# Patient Record
Sex: FEMALE | Race: WHITE
[De-identification: names, ages, dates, MRNs, and addresses within clinical notes are randomized per-mention and may not be internally consistent; named-entity substitution may affect disease eponyms.]

---

## 2017-01-04 ENCOUNTER — HOSPITAL ENCOUNTER (EMERGENCY)
Dept: HOSPITAL 33 - ED | Age: 32
LOS: 1 days | Discharge: HOME | End: 2017-01-05
Payer: MEDICARE

## 2017-01-04 DIAGNOSIS — G40.409: Primary | ICD-10-CM

## 2017-01-04 DIAGNOSIS — E05.90: ICD-10-CM

## 2017-01-04 DIAGNOSIS — Z87.898: ICD-10-CM

## 2017-01-04 DIAGNOSIS — H54.8: ICD-10-CM

## 2017-01-04 LAB
ALBUMIN SERPL-MCNC: 3.6 G/DL (ref 3.4–5)
ALP SERPL-CCNC: 37 U/L (ref 46–116)
ALT SERPL-CCNC: 14 U/L (ref 12–78)
ANION GAP SERPL CALC-SCNC: 14.2 MEQ/L (ref 5–15)
AST SERPL QL: 15 U/L (ref 15–37)
BASOPHILS NFR BLD AUTO: 0.2 % (ref 0–0.4)
BILIRUB BLD-MCNC: 0.6 MG/DL (ref 0.2–1)
BUN SERPL-MCNC: 10 MG/DL (ref 9–20)
CHLORIDE SERPL-SCNC: 108 MEQ/L (ref 98–107)
CO2 SERPL-SCNC: 25.3 MEQ/L (ref 21–32)
COLLECTION TYPE: (no result)
COMPLETE URINE MICROSCOPIC?: NO
GLUCOSE SERPL-MCNC: 104 MG/DL (ref 70–110)
LIPASE SERPL-CCNC: 301 U/L (ref 73–393)
MCH RBC QN AUTO: 31.3 PG (ref 26–32)
NEUTROPHILS NFR BLD AUTO: 56.4 % (ref 36–66)
PLATELET # BLD AUTO: 204 K/MM3 (ref 150–450)
POTASSIUM SERPLBLD-SCNC: 3.6 MEQ/L (ref 3.5–5.1)
PROT SERPL-MCNC: 7.1 GM/DL (ref 6.4–8.2)
RBC # BLD AUTO: 3.79 M/MM3 (ref 4.1–5.4)
SODIUM SERPL-SCNC: 144 MEQ/L (ref 136–145)
WBC # BLD AUTO: 4.9 K/MM3 (ref 4–10.5)

## 2017-01-04 PROCEDURE — 36415 COLL VENOUS BLD VENIPUNCTURE: CPT

## 2017-01-04 PROCEDURE — 84703 CHORIONIC GONADOTROPIN ASSAY: CPT

## 2017-01-04 PROCEDURE — 96361 HYDRATE IV INFUSION ADD-ON: CPT

## 2017-01-04 PROCEDURE — 83605 ASSAY OF LACTIC ACID: CPT

## 2017-01-04 PROCEDURE — 83690 ASSAY OF LIPASE: CPT

## 2017-01-04 PROCEDURE — 99283 EMERGENCY DEPT VISIT LOW MDM: CPT

## 2017-01-04 PROCEDURE — 81002 URINALYSIS NONAUTO W/O SCOPE: CPT

## 2017-01-04 PROCEDURE — 96374 THER/PROPH/DIAG INJ IV PUSH: CPT

## 2017-01-04 PROCEDURE — 96360 HYDRATION IV INFUSION INIT: CPT

## 2017-01-04 PROCEDURE — 93005 ELECTROCARDIOGRAM TRACING: CPT

## 2017-01-04 PROCEDURE — 85025 COMPLETE CBC W/AUTO DIFF WBC: CPT

## 2017-01-04 PROCEDURE — 82550 ASSAY OF CK (CPK): CPT

## 2017-01-04 PROCEDURE — 80307 DRUG TEST PRSMV CHEM ANLYZR: CPT

## 2017-01-04 PROCEDURE — 80053 COMPREHEN METABOLIC PANEL: CPT

## 2017-01-04 NOTE — ERPHSYRPT
- History of Present Illness


Time Seen by Provider: 01/04/17 22:39


Source: family ( and sister), EMS


Patient Subjective Stated Complaint: per pt's sister, pt has seizure like 

epidoses when she has pain. has had 15+ episodes today since 2030


Triage Nursing Assessment: pt in bed with eyes closed. does not respond to 

verbal. pt not able to follow commands. family in [facility] states this is normal 

for pt when she has these episodes and she will remain nonresponsive until she 

has pain relief.


Physician History: 





CC: seizure episode


Hx: 30 y/o patient Dr Johnson and [facility] GI Dr Rodriguez. She has hx of 

nonepileptiform seizures diagnosed with video EEG at [facility]. She has hx of 

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 She does 

not take narcotics so as not to worsened sphincter dysfunction. She usually 

received ativan and toradol and improves.





She went to [facility] and began having intermittent seizure episodes. No 

provoking incident or stress. She has several children at home. She is 

accompanied here by sister and . No fall or injury. No recent fever, 

headache, vomiting. The episodes today are consistent with those of the past. 





Surg: BTL, GB, vaginal reconstruction


Social: Blind form premature macular degeneration


ILL: hypothyroidism





Allergies/Adverse Reactions: 








No Known Drug Allergies Allergy (Unverified 01/04/17 22:37)


 





Home Medications: 








Levothyroxine Sodium 50 Mcg*** [Synthroid 50 Mcg***] 50 mcg PO DAILY 01/04/17 [

History]





Hx Tetanus, Diphtheria Vaccination/Date Given: Yes


Hx Influenza Vaccination/Date Given: No


Hx Pneumococcal Vaccination/Date Given: No


Immunizations Up to Date: Yes





- Review of Systems


Constitutional: No Fever, No Chills


Eyes: No Symptoms


Ears, Nose, & Throat: No Symptoms


Respiratory: No Cough, No Dyspnea


Cardiac: No Chest Pain, No Syncope


Abdominal/Gastrointestinal: Abdominal Pain, No Nausea, No Vomiting, No Diarrhea


Genitourinary Symptoms: No Dysuria


Skin: No Rash


Neurological: Seizure, No Focal Weakness, No Headache, No Parasthesia


All Other Systems: Reviewed and Negative (from family)





- Past Medical History


Pertinent Past Medical History: Yes


Neurological History: Other


Endocrine Medical History: Hyperthyroidism


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 

like activity whe she has pain. legally blind





- Past Surgical History


Past Surgical History: Yes


Female Surgical History: Tubal Ligation, Other


Other Surgical History: vaginal reconstruction





- Social History


Smoking Status: Never smoker


Exposure to second hand smoke: No


Drug Use: none


Patient Lives Alone: No





- Female History


Hx Last Menstrual Period: unsure





- Nursing Vital Signs


Nursing Vital Signs: 


 Initial Vital Signs











Temperature                    97.9 F


 


Temperature Source             Axillary


 


Pulse Rate                     88


 


Respiratory Rate               16


 


Blood Pressure                 118/72

















- Physical Exam


General Appearance: alert


Eye Exam: PERRL/EOMI, other (fluttering of eyes)


Ears, Nose, Throat Exam: normal ENT inspection, moist mucous membranes


Neck Exam: normal inspection, non-tender, supple


Respiratory Exam: normal breath sounds, lungs clear


Cardiovascular Exam: regular rate/rhythm, No murmur


Gastrointestinal/Abdomen Exam: soft, No tenderness, No distention, No mass, No 

guarding


Back Exam: normal inspection, normal range of motion


Extremity Exam: normal inspection, normal range of motion


Neurologic Exam: alert, other (she is poorly responsive but does open eyes, 

squeeze hands and move feet to command.)


Skin Exam: warm, dry, No rash


**SpO2 Interpretation**: normal


SpO2: 98


Oxygen Delivery: Room Air





- Course


Nursing assessment & vital signs reviewed: Yes


EKG Interpreted by Me: RATE (75), Sinus Rhythm, NORMAL AXIS, NORMAL INTERVALS (

QTc 453), NORMAL QRS, NORMAL ST-T


Ordered Tests: 


 Active Orders 24 hr











 Category Date Time Status


 


 Cath for Specimen-Straight STAT Care  01/04/17 22:45 Active


 


 EKG-ER Only STAT Care  01/04/17 22:45 Active


 


 IV Insertion STAT Care  01/04/17 22:45 Active


 


 CBC W DIFF Stat Lab  01/04/17 23:24 Completed


 


 CK-Creatinine Phosphokinase Stat Lab  01/04/17 23:24 Completed


 


 CMP Stat Lab  01/04/17 23:24 Completed


 


 HCG QUALITATIVE,SERUM Stat Lab  01/04/17 23:24 Completed


 


 LIPASE Stat Lab  01/04/17 23:24 Completed


 


 Lactic Acid Urgent Lab  01/04/17 23:10 Completed


 


 UA Stat Lab  01/04/17 23:25 Completed


 


 Urine Triage Profile Stat Lab  01/04/17 23:25 Completed








Medication Summary














Discontinued Medications














Generic Name Dose Route Start Last Admin





  Trade Name Freq  PRN Reason Stop Dose Admin


 


Sodium Chloride  1,000 mls @ 999 mls/hr  01/04/17 22:45  01/04/17 22:55





  Sodium Chloride 0.9% 1000 Ml  IV  01/04/17 23:45  999 mls/hr





  .Q1H1M STA   Administration


 


Sodium Chloride  Confirm  01/04/17 22:54  





  Sodium Chloride 0.9% 1000 Ml  Administered  01/04/17 22:55  





  Dose   





  1,000 mls @ ud   





  .ROUTE   





  .STK-MED ONE   


 


Ketorolac Tromethamine  30 mg  01/04/17 22:45  01/04/17 22:55





  Toradol 30 Mg Injection***  IV  01/04/17 22:46  30 mg





  STAT ONE   Administration


 


Ketorolac Tromethamine  Confirm  01/04/17 22:54  





  Toradol 30 Mg Injection***  Administered  01/04/17 22:55  





  Dose   





  30 mg   





  .ROUTE   





  .STK-MED ONE   


 


Lorazepam  1 mg  01/04/17 22:45  01/04/17 22:55





  Ativan 2 Mg/1 Ml Vial***  IV  01/04/17 22:46  1 mg





  STAT ONE   Administration


 


Lorazepam  Confirm  01/04/17 22:54  





  Ativan 2 Mg/1 Ml Vial***  Administered  01/04/17 22:55  





  Dose   





  2 mg   





  .ROUTE   





  .STK-MED ONE   











Lab/Rad Data: 


 Laboratory Result Diagrams





 01/04/17 23:24 





 01/04/17 23:24 





 Laboratory Results











  01/04/17 01/04/17 01/04/17 Range/Units





  23:25 23:25 23:24 


 


WBC     (4.0-10.5)  K/mm3


 


RBC     (4.1-5.4)  M/mm3


 


Hgb     (12.0-16.0)  gm/dl


 


Hct     (35-47)  %


 


MCV     ()  fl


 


MCH     (26-32)  pg


 


MCHC     (32-36)  g/dl


 


RDW     (11.5-14.0)  %


 


Plt Count     (150-450)  K/mm3


 


MPV     (6-9.5)  fl


 


Gran %     (36.0-66.0)  %


 


Lymphocytes %     (24.0-44.0)  %


 


Monocytes %     (0.0-12.0)  %


 


Eosinophils %     (0.00-5.0)  %


 


Basophils %     (0.0-0.4)  %


 


Basophils #     (0-0.4)  


 


Sodium     (136-145)  mEq/L


 


Potassium     (3.5-5.1)  mEq/L


 


Chloride     ()  mEq/L


 


Carbon Dioxide     (21-32)  mEq/L


 


Anion Gap     (5-15)  MEQ/L


 


BUN     (9-20)  mg/dL


 


Creatinine     (0.55-1.30)  mg/dl


 


Estimated GFR     ML/MIN


 


Glucose     ()  MG/DL


 


Lactic Acid     (0.4-2.0)  


 


Calcium     (8.5-10.1)  mg/dL


 


Total Bilirubin     (0.2-1.0)  mg/dL


 


AST     (15-37)  U/L


 


ALT     (12-78)  U/L


 


Alkaline Phosphatase     ()  U/L


 


Creatine Kinase     ()  U/L


 


Serum Total Protein     (6.4-8.2)  gm/dL


 


Albumin     (3.4-5.0)  g/dL


 


Lipase     ()  U/L


 


Serum Pregnancy, Qual    NEGATIVE  (Negative)  


 


Ur Collection Type   CATH   


 


Urine Color   YELLOW   (YELLOW)  


 


Urine Appearance   CLEAR   (CLEAR)  


 


Urine pH   5.5   (5-6)  


 


Ur Specific Gravity   <=1.005   (1.005-1.025)  


 


Urine Protein   NEGATIVE   (Negative)  


 


Urine Glucose (UA)   NEGATIVE   (NEGATIVE)  mg/dL


 


Urine Ketones   NEGATIVE   (NEGATIVE)  


 


Urine Nitrite   NEGATIVE   (NEGATIVE)  


 


Urine Bilirubin   NEGATIVE   (NEGATIVE)  


 


Urine Urobilinogen   0.2   (0-1)  mg/dL


 


Urine WBC (Auto)   NEGATIVE   (NEGATIVE)  


 


Urine RBC (Auto)   NEGATIVE   (0-5)  Luis Manuel/ul


 


Urine Opiates Level  NEG.    (NEGATIVE)  


 


Ur Methadone  NEG.    (NEGATIVE)  


 


Urine Barbiturates  NEG.    (NEGATIVE)  


 


Ur Phencyclidine (PCP)  NEG.    (NEGATIVE)  


 


Urine Amphetamine  NEG.    (NEGATIVE)  


 


U Benzodiazepine Level  NEG.    (NEGATIVE)  


 


Urine Cocaine  NEG.    (NEGATIVE)  


 


Urine Marijuana (THC)  NEG.    (NEGATIVE)  


 


Specimen Received   1/4/17 [id number]   














  01/04/17 01/04/17 01/04/17 Range/Units





  23:24 23:24 23:10 


 


WBC   4.9   (4.0-10.5)  K/mm3


 


RBC   3.79 L   (4.1-5.4)  M/mm3


 


Hgb   11.9 L   (12.0-16.0)  gm/dl


 


Hct   34.7 L   (35-47)  %


 


MCV   91.6   ()  fl


 


MCH   31.3   (26-32)  pg


 


MCHC   34.3   (32-36)  g/dl


 


RDW   12.4   (11.5-14.0)  %


 


Plt Count   204   (150-450)  K/mm3


 


MPV   11.0 H   (6-9.5)  fl


 


Gran %   56.4   (36.0-66.0)  %


 


Lymphocytes %   33.3   (24.0-44.0)  %


 


Monocytes %   7.4   (0.0-12.0)  %


 


Eosinophils %   2.7   (0.00-5.0)  %


 


Basophils %   0.2   (0.0-0.4)  %


 


Basophils #   0.01   (0-0.4)  


 


Sodium  144    (136-145)  mEq/L


 


Potassium  3.6    (3.5-5.1)  mEq/L


 


Chloride  108 H    ()  mEq/L


 


Carbon Dioxide  25.3    (21-32)  mEq/L


 


Anion Gap  14.2    (5-15)  MEQ/L


 


BUN  10    (9-20)  mg/dL


 


Creatinine  0.78    (0.55-1.30)  mg/dl


 


Estimated GFR  > 60    ML/MIN


 


Glucose  104    ()  MG/DL


 


Lactic Acid    2.3 H  (0.4-2.0)  


 


Calcium  8.4 L    (8.5-10.1)  mg/dL


 


Total Bilirubin  0.6    (0.2-1.0)  mg/dL


 


AST  15    (15-37)  U/L


 


ALT  14    (12-78)  U/L


 


Alkaline Phosphatase  37 L    ()  U/L


 


Creatine Kinase  67    ()  U/L


 


Serum Total Protein  7.1    (6.4-8.2)  gm/dL


 


Albumin  3.6    (3.4-5.0)  g/dL


 


Lipase  301    ()  U/L


 


Serum Pregnancy, Qual     (Negative)  


 


Ur Collection Type     


 


Urine Color     (YELLOW)  


 


Urine Appearance     (CLEAR)  


 


Urine pH     (5-6)  


 


Ur Specific Gravity     (1.005-1.025)  


 


Urine Protein     (Negative)  


 


Urine Glucose (UA)     (NEGATIVE)  mg/dL


 


Urine Ketones     (NEGATIVE)  


 


Urine Nitrite     (NEGATIVE)  


 


Urine Bilirubin     (NEGATIVE)  


 


Urine Urobilinogen     (0-1)  mg/dL


 


Urine WBC (Auto)     (NEGATIVE)  


 


Urine RBC (Auto)     (0-5)  Luis Manuel/ul


 


Urine Opiates Level     (NEGATIVE)  


 


Ur Methadone     (NEGATIVE)  


 


Urine Barbiturates     (NEGATIVE)  


 


Ur Phencyclidine (PCP)     (NEGATIVE)  


 


Urine Amphetamine     (NEGATIVE)  


 


U Benzodiazepine Level     (NEGATIVE)  


 


Urine Cocaine     (NEGATIVE)  


 


Urine Marijuana (THC)     (NEGATIVE)  


 


Specimen Received     














- Progress


Progress Note: 





01/04/17 23:39


Pt improving with IVF, toradol and ativan. REcords from [facility] reviewed.





01/05/17 00:04


Pt states she is better. She is talking and visiting with family. This appears 

to be nonepileptiform seizures. Normal labs. She reports hx of sphincter of 

oddi dysfunction. Will release with instructions. Family and patient 

comfortable with plan.





Counseled pt/family regarding: lab results, diagnosis, need for follow-up, rad 

results





- Departure


Time of Disposition: 00:05


Departure Disposition: Home


Clinical Impression: 


 nonepileptiform seizures, hx sphincter of oddi dysfunction





Condition: Stable


Critical Care Time: No


Referrals: 


GIL CAN MD [Primary Care Provider] - 


MARIS JOHNSON MD [Family Provider] - 


Instructions:  Seizure (Not Epilepsy/Seizure Disorder)


Additional Instructions: 


No driving, climbing, swimming.


Have someone help with .


Stay with family.


Return for problems or concerns.


Follow up with your physician tomorrow.

## 2017-01-05 VITALS — HEART RATE: 63 BPM | DIASTOLIC BLOOD PRESSURE: 61 MMHG | SYSTOLIC BLOOD PRESSURE: 103 MMHG

## 2017-01-05 VITALS — OXYGEN SATURATION: 99 %

## 2018-06-23 ENCOUNTER — HOSPITAL ENCOUNTER (OUTPATIENT)
Dept: HOSPITAL 33 - ED | Age: 33
Setting detail: OBSERVATION
LOS: 2 days | Discharge: HOME | End: 2018-06-25
Attending: INTERNAL MEDICINE | Admitting: INTERNAL MEDICINE
Payer: MEDICARE

## 2018-06-23 DIAGNOSIS — Z79.899: ICD-10-CM

## 2018-06-23 DIAGNOSIS — R11.2: Primary | ICD-10-CM

## 2018-06-23 DIAGNOSIS — R10.10: ICD-10-CM

## 2018-06-23 LAB
AMPHETAMINES UR QL: NEGATIVE
BARBITURATES UR QL: NEGATIVE
BASOPHILS # BLD AUTO: 0.01 10*3/UL (ref 0–0.4)
BASOPHILS NFR BLD AUTO: 0.2 % (ref 0–0.4)
BENZODIAZ UR QL SCN: NEGATIVE
COCAINE UR QL SCN: NEGATIVE
EOSINOPHIL # BLD AUTO: 0.06 10*3/UL (ref 0–0.5)
GLUCOSE UR-MCNC: NEGATIVE MG/DL
GRANULOCYTES # BLD AUTO: 2.14 10*3/UL (ref 1.4–6.9)
HCT VFR BLD AUTO: 35.8 % (ref 35–47)
HGB BLD-MCNC: 12.8 GM/DL (ref 12–16)
LYMPHOCYTES # SPEC AUTO: 1.43 10*3/UL (ref 1–4.6)
MCH RBC QN AUTO: 32.3 PG (ref 26–32)
MCHC RBC AUTO-ENTMCNC: 35.8 G/DL (ref 32–36)
METHADONE UR QL: NEGATIVE
MONOCYTES # BLD AUTO: 0.44 10*3/UL (ref 0–1.3)
NEUTROPHILS NFR BLD AUTO: 52.5 % (ref 36–66)
OPIATES UR QL: POSITIVE
PCP UR QL CFM>20 NG/ML: NEGATIVE
PLATELET # BLD AUTO: 199 K/MM3 (ref 150–450)
PROT UR STRIP-MCNC: NEGATIVE MG/DL
RBC # BLD AUTO: 3.96 M/MM3 (ref 4.1–5.4)
THC UR QL SCN: NEGATIVE
WBC # BLD AUTO: 4.1 K/MM3 (ref 4–10.5)

## 2018-06-23 PROCEDURE — 82150 ASSAY OF AMYLASE: CPT

## 2018-06-23 PROCEDURE — 36415 COLL VENOUS BLD VENIPUNCTURE: CPT

## 2018-06-23 PROCEDURE — 85025 COMPLETE CBC W/AUTO DIFF WBC: CPT

## 2018-06-23 PROCEDURE — 82947 ASSAY GLUCOSE BLOOD QUANT: CPT

## 2018-06-23 PROCEDURE — 93005 ELECTROCARDIOGRAM TRACING: CPT

## 2018-06-23 PROCEDURE — G0378 HOSPITAL OBSERVATION PER HR: HCPCS

## 2018-06-23 PROCEDURE — 96375 TX/PRO/DX INJ NEW DRUG ADDON: CPT

## 2018-06-23 PROCEDURE — 80307 DRUG TEST PRSMV CHEM ANLYZR: CPT

## 2018-06-23 PROCEDURE — 81002 URINALYSIS NONAUTO W/O SCOPE: CPT

## 2018-06-23 PROCEDURE — 83605 ASSAY OF LACTIC ACID: CPT

## 2018-06-23 PROCEDURE — 96360 HYDRATION IV INFUSION INIT: CPT

## 2018-06-23 PROCEDURE — 84443 ASSAY THYROID STIM HORMONE: CPT

## 2018-06-23 PROCEDURE — 80053 COMPREHEN METABOLIC PANEL: CPT

## 2018-06-23 PROCEDURE — 83690 ASSAY OF LIPASE: CPT

## 2018-06-23 PROCEDURE — 74177 CT ABD & PELVIS W/CONTRAST: CPT

## 2018-06-23 PROCEDURE — 84484 ASSAY OF TROPONIN QUANT: CPT

## 2018-06-23 PROCEDURE — 96374 THER/PROPH/DIAG INJ IV PUSH: CPT

## 2018-06-23 PROCEDURE — 99285 EMERGENCY DEPT VISIT HI MDM: CPT

## 2018-06-23 PROCEDURE — 74178 CT ABD&PLV WO CNTR FLWD CNTR: CPT

## 2018-06-23 PROCEDURE — 84703 CHORIONIC GONADOTROPIN ASSAY: CPT

## 2018-06-23 PROCEDURE — 96376 TX/PRO/DX INJ SAME DRUG ADON: CPT

## 2018-06-23 NOTE — ERPHSYRPT
- History of Present Illness


Time Seen by Provider: 18 22:45


Historian: patient


Exam Limitations: no limitations


Patient Subjective Stated Complaint: Pt states "I had sphincter oddi and in 

october of last year I had a sphincterotomy to try and alleviate my problems 

and now I have a mild pancreatitis. My stomach has been hurting for 3 days and 

I cannot keep anything down.  I have not eaten a meal in 3 days, just little 

bites of things here and there."


Triage Nursing Assessment: Pt alert and oriented X 3, skin pwd.  Pt ambulates 

with an upright steady gait, able to speak in clear full sentences. Pt holding 

abdomen.


Physician History: 





Pt started c/o upper abdominal pain, nausea, vomiting 3 days ago. She denies 

fever, chills, diarrhea, vomiting blood or coffeeground material. She has been 

diagnosed with dysfunctional Oddi sphincter, pancreatitis, underwent 

cholecystectomy and in 10/2017 endoscopic Oddi sphincerotomy. 


Timing/Duration: day(s) (3)


Activities at Onset: none


Quality: cramping, sharpness


Abdominal Pain Onset Location: LUQ, epigastric


Pain Radiation: back


Severity of Pain-Max: severe


Severity of Pain-Current: severe


Modifying Factors: Improves With: nothing


Associated Symptoms: loss of appetite, nausea, vomiting


Previous symptoms: same symptoms as today


Allergies/Adverse Reactions: 








No Known Drug Allergies Allergy (Unverified 17 22:37)


 





Home Medications: 








Levothyroxine Sodium 50 Mcg*** [Synthroid 50 Mcg***] 50 mcg PO DAILY 17 [

History]





Hx Tetanus, Diphtheria Vaccination/Date Given: No


Hx Influenza Vaccination/Date Given: No


Hx Pneumococcal Vaccination/Date Given: No


Immunizations Up to Date: Yes





- Review of Systems


Constitutional: No Symptoms


Abdominal/Gastrointestinal: Abdominal Pain, Nausea, Vomiting


All Other Systems: Reviewed and Negative





- Past Medical History


Pertinent Past Medical History: Yes


Neurological History: Other


Endocrine Medical History: Hyperthyroidism


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 

like activity whe she has pain. legally blind





- Past Surgical History


Past Surgical History: Yes


Female Surgical History: Tubal Ligation, Other


Other Surgical History: vaginal reconstruction,.  tubal.  sphincterotomy





- Social History


Smoking Status: Never smoker


Exposure to second hand smoke: No


Drug Use: none


Patient Lives Alone: No





- Female History


Hx Last Menstrual Period: 06/15/2018


Hx Pregnant Now: No





- Nursing Vital Signs


Nursing Vital Signs: 


 Initial Vital Signs











Temperature  98.4 F   18 22:38


 


Pulse Rate  66   18 22:38


 


Respiratory Rate  16   18 22:38


 


Blood Pressure  105/62   18 22:38


 


O2 Sat by Pulse Oximetry  100   18 22:38








 Pain Scale











Pain Intensity                 8

















- Physical Exam


General Appearance: no apparent distress


Eye Exam: eyes nml inspection


Ears, Nose, Throat Exam: normal ENT inspection, moist mucous membranes


Neck Exam: normal inspection, non-tender


Respiratory Exam: normal breath sounds, lungs clear, airway intact


Cardiovascular Exam: regular rate/rhythm, normal heart sounds, normal 

peripheral pulses, No murmur


Gastrointestinal/Abdomen Exam: soft, normal bowel sounds, tenderness (diffuse, 

upper abdominal), No distention, No mass, No guarding, No ecchymosis, No 

pulsatile mass, No rebound, No hernia, No organomegaly


Pelvic Exam: not done


Back Exam: normal inspection, CVA tenderness (bilateral)


Extremity Exam: normal inspection, No calf tenderness, No pedal edema


Neurologic Exam: alert, oriented x 3, normal mood/affect


Skin Exam: normal color, warm, dry, No rash


Lymphatic Exam: No adenopathy


**SpO2 Interpretation**: normal


SpO2: 100


Oxygen Delivery: Room Air





- Course


Nursing assessment & vital signs reviewed: Yes


EKG Interpreted by Me: RATE (75/min), NORMAL AXIS, NORMAL INTERVALS, NORMAL ST-T

, Other (repeat Ek:46 AM: )





- CT Exams


  ** Abdomen/Pelvis


CT Interpretation: Tele-radiologist Report, Other (mesenteric edema)


Ordered Tests: 


 Active Orders 24 hr











 Category Date Time Status


 


 EKG-ER Only STAT Care  18 22:50 Active


 


 EKG-ER Only STAT Care  18 01:24 Active


 


 IV Insertion STAT Care  18 22:50 Active


 


 ABDOMEN AND PELVIS W CONTRAST [CT] Stat Exams  18 22:50 Taken


 


 AMYLASE Stat Lab  18 23:10 Completed


 


 CBC W DIFF Stat Lab  18 23:10 Completed


 


 CMP Stat Lab  18 23:10 Completed


 


 HCG QUALITATIVE,SERUM Stat Lab  18 23:10 Completed


 


 LIPASE Stat Lab  18 23:10 Completed


 


 Lactic Acid Stat Lab  18 23:05 Completed


 


 TROPONIN Q3H Lab  18 23:10 Completed


 


 TROPONIN Q3H Lab  18 02:00 Received


 


 TROPONIN Q3H Lab  18 05:00 Ordered


 


 TROPONIN Q3H Lab  18 08:00 Ordered


 


 TROPONIN Q3H Lab  18 11:00 Ordered


 


 UA W/RFX UR CULTURE Stat Lab  18 22:50 Completed


 


 Urine Triage Profile Stat Lab  18 22:50 Completed








Medication Summary














Discontinued Medications














Generic Name Dose Route Start Last Admin





  Trade Name Nagaq  PRN Reason Stop Dose Admin


 


Famotidine  20 mg  18 22:50  18 23:20





  Pepcid 20 Mg Vial***  IV  18 22:51  20 mg





  STAT ONE   Administration





     





     





     





     


 


Famotidine  Confirm  18 23:11  





  Pepcid 20 Mg Vial***  Administered  18 23:12  





  Dose   





  20 mg   





  IV   





  .STK-MED ONE   





     





     





     





     


 


Fentanyl Citrate  50 mcg  18 22:50  18 23:26





  Sublimaze 100 Mcg/2 Ml***  IV  18 22:51  25 mcg





  STAT ONE   Administration





     





     





     





     


 


Fentanyl Citrate  Confirm  18 23:11  





  Sublimaze 100 Mcg/2 Ml***  Administered  18 23:12  





  Dose   





  100 mcg   





  .ROUTE   





  .STK-MED ONE   





     





     





     





     


 


Fentanyl Citrate  Confirm  18 00:50  





  Sublimaze 100 Mcg/2 Ml***  Administered  18 00:51  





  Dose   





  100 mcg   





  .ROUTE   





  .STK-MED ONE   





     





     





     





     


 


Fentanyl Citrate  25 mcg  18 00:51  18 00:52





  Sublimaze 100 Mcg/2 Ml***  IV  18 00:52  25 mcg





  STAT ONE   Administration





     





     





     





     


 


Sodium Chloride  1,000 mls @ 999 mls/hr  18 22:50  18 00:30





  Sodium Chloride 0.9% 1000 Ml  IV  18 23:50  Infused





  .Q1H1M STA   Infusion





     





     





     





     


 


Sodium Chloride  Confirm  18 23:12  





  Sodium Chloride 0.9% 1000 Ml  Administered  18 23:13  





  Dose   





  1,000 mls @ ud   





  .ROUTE   





  .STK-MED ONE   





     





     





     





     


 


Ondansetron HCl  4 mg  18 22:50  18 23:22





  Zofran 4 Mg/2 Ml Vial**  IV  18 22:51  4 mg





  STAT ONE   Administration





     





     





     





     


 


Ondansetron HCl  Confirm  18 23:11  





  Zofran 4 Mg/2 Ml Vial**  Administered  18 23:12  





  Dose   





  4 mg   





  .ROUTE   





  .STK-MED ONE   





     





     





     





     











Lab/Rad Data: 


 Laboratory Result Diagrams





 18 23:10 





 18 23:10 





 Laboratory Results











  18 Range/Units





  23:10 23:10 23:10 


 


WBC     (4.0-10.5)  K/mm3


 


RBC     (4.1-5.4)  M/mm3


 


Hgb     (12.0-16.0)  gm/dl


 


Hct     (35-47)  %


 


MCV     ()  fl


 


MCH     (26-32)  pg


 


MCHC     (32-36)  g/dl


 


RDW     (11.5-14.0)  %


 


Plt Count     (150-450)  K/mm3


 


MPV     (6-9.5)  fl


 


Gran %     (36.0-66.0)  %


 


Eos # (Auto)     (0-0.5)  


 


Absolute Lymphs (auto)     (1.0-4.6)  


 


Absolute Monos (auto)     (0.0-1.3)  


 


Lymphocytes %     (24.0-44.0)  %


 


Monocytes %     (0.0-12.0)  %


 


Eosinophils %     (0.00-5.0)  %


 


Basophils %     (0.0-0.4)  %


 


Absolute Granulocytes     (1.4-6.9)  


 


Basophils #     (0-0.4)  


 


Sodium    138  (137-145)  mmol/L


 


Potassium    3.7  (3.5-5.1)  mmol/L


 


Chloride    103  ()  mmol/L


 


Carbon Dioxide    27  (22-30)  mmol/L


 


Anion Gap    11.8  (5-15)  MEQ/L


 


BUN    14  (7-17)  mg/dL


 


Creatinine    0.67  (0.52-1.04)  mg/dL


 


Estimated GFR    > 60.0  ML/MIN


 


Glucose    99  ()  mg/dL


 


Lactic Acid     (0.4-2.0)  


 


Calcium    9.3  (8.4-10.2)  mg/dL


 


Total Bilirubin    2.00 H  (0.2-1.3)  mg/dL


 


AST    21  (14-36)  U/L


 


ALT    17  (0-35)  U/L


 


Alkaline Phosphatase    48  ()  U/L


 


Troponin I   < 0.012   (0.000-0.034)  ng/mL


 


Serum Total Protein    7.6  (6.3-8.2)  g/dL


 


Albumin    4.3  (3.5-5.0)  g/dL


 


Amylase    66  ()  U/L


 


Lipase    138  ()  U/L


 


Serum Pregnancy, Qual  NEGATIVE    (Negative)  


 


Ur Collection Type     


 


Urine Color     (YELLOW)  


 


Urine Appearance     (CLEAR)  


 


Urine pH     (5-6)  


 


Ur Specific Gravity     (1.005-1.025)  


 


Urine Protein     (Negative)  


 


Urine Ketones     (NEGATIVE)  


 


Urine Blood     (0-5)  Luis Manuel/ul


 


Urine Nitrite     (NEGATIVE)  


 


Urine Bilirubin     (NEGATIVE)  


 


Urine Urobilinogen     (0-1)  mg/dL


 


Ur Leukocyte Esterase     (NEGATIVE)  


 


Urine Culture Reflexed     (NO)  


 


Urine Glucose     (NEGATIVE)  mg/dL


 


Urine Opiates Level     (NEGATIVE)  


 


Ur Methadone     (NEGATIVE)  


 


Urine Barbiturates     (NEGATIVE)  


 


Ur Phencyclidine (PCP)     (NEGATIVE)  


 


Urine Amphetamine     (NEGATIVE)  


 


U Benzodiazepine Level     (NEGATIVE)  


 


Urine Cocaine     (NEGATIVE)  


 


Urine Marijuana (THC)     (NEGATIVE)  


 


Specimen Received     














  18 Range/Units





  23:10 23:05 22:50 


 


WBC  4.1    (4.0-10.5)  K/mm3


 


RBC  3.96 L    (4.1-5.4)  M/mm3


 


Hgb  12.8    (12.0-16.0)  gm/dl


 


Hct  35.8    (35-47)  %


 


MCV  90.4    ()  fl


 


MCH  32.3 H    (26-32)  pg


 


MCHC  35.8    (32-36)  g/dl


 


RDW  12.0    (11.5-14.0)  %


 


Plt Count  199    (150-450)  K/mm3


 


MPV  10.9 H    (6-9.5)  fl


 


Gran %  52.5    (36.0-66.0)  %


 


Eos # (Auto)  0.06    (0-0.5)  


 


Absolute Lymphs (auto)  1.43    (1.0-4.6)  


 


Absolute Monos (auto)  0.44    (0.0-1.3)  


 


Lymphocytes %  35.0    (24.0-44.0)  %


 


Monocytes %  10.8    (0.0-12.0)  %


 


Eosinophils %  1.5    (0.00-5.0)  %


 


Basophils %  0.2    (0.0-0.4)  %


 


Absolute Granulocytes  2.14    (1.4-6.9)  


 


Basophils #  0.01    (0-0.4)  


 


Sodium     (137-145)  mmol/L


 


Potassium     (3.5-5.1)  mmol/L


 


Chloride     ()  mmol/L


 


Carbon Dioxide     (22-30)  mmol/L


 


Anion Gap     (5-15)  MEQ/L


 


BUN     (7-17)  mg/dL


 


Creatinine     (0.52-1.04)  mg/dL


 


Estimated GFR     ML/MIN


 


Glucose     ()  mg/dL


 


Lactic Acid   0.8   (0.4-2.0)  


 


Calcium     (8.4-10.2)  mg/dL


 


Total Bilirubin     (0.2-1.3)  mg/dL


 


AST     (14-36)  U/L


 


ALT     (0-35)  U/L


 


Alkaline Phosphatase     ()  U/L


 


Troponin I     (0.000-0.034)  ng/mL


 


Serum Total Protein     (6.3-8.2)  g/dL


 


Albumin     (3.5-5.0)  g/dL


 


Amylase     ()  U/L


 


Lipase     ()  U/L


 


Serum Pregnancy, Qual     (Negative)  


 


Ur Collection Type     


 


Urine Color     (YELLOW)  


 


Urine Appearance     (CLEAR)  


 


Urine pH     (5-6)  


 


Ur Specific Gravity     (1.005-1.025)  


 


Urine Protein     (Negative)  


 


Urine Ketones     (NEGATIVE)  


 


Urine Blood     (0-5)  Luis Manuel/ul


 


Urine Nitrite     (NEGATIVE)  


 


Urine Bilirubin     (NEGATIVE)  


 


Urine Urobilinogen     (0-1)  mg/dL


 


Ur Leukocyte Esterase     (NEGATIVE)  


 


Urine Culture Reflexed     (NO)  


 


Urine Glucose     (NEGATIVE)  mg/dL


 


Urine Opiates Level    POSITIVE  (NEGATIVE)  


 


Ur Methadone    NEGATIVE  (NEGATIVE)  


 


Urine Barbiturates    NEGATIVE  (NEGATIVE)  


 


Ur Phencyclidine (PCP)    NEGATIVE  (NEGATIVE)  


 


Urine Amphetamine    NEGATIVE  (NEGATIVE)  


 


U Benzodiazepine Level    NEGATIVE  (NEGATIVE)  


 


Urine Cocaine    NEGATIVE  (NEGATIVE)  


 


Urine Marijuana (THC)    NEGATIVE  (NEGATIVE)  


 


Specimen Received     














  18 Range/Units





  22:50 


 


WBC   (4.0-10.5)  K/mm3


 


RBC   (4.1-5.4)  M/mm3


 


Hgb   (12.0-16.0)  gm/dl


 


Hct   (35-47)  %


 


MCV   ()  fl


 


MCH   (26-32)  pg


 


MCHC   (32-36)  g/dl


 


RDW   (11.5-14.0)  %


 


Plt Count   (150-450)  K/mm3


 


MPV   (6-9.5)  fl


 


Gran %   (36.0-66.0)  %


 


Eos # (Auto)   (0-0.5)  


 


Absolute Lymphs (auto)   (1.0-4.6)  


 


Absolute Monos (auto)   (0.0-1.3)  


 


Lymphocytes %   (24.0-44.0)  %


 


Monocytes %   (0.0-12.0)  %


 


Eosinophils %   (0.00-5.0)  %


 


Basophils %   (0.0-0.4)  %


 


Absolute Granulocytes   (1.4-6.9)  


 


Basophils #   (0-0.4)  


 


Sodium   (137-145)  mmol/L


 


Potassium   (3.5-5.1)  mmol/L


 


Chloride   ()  mmol/L


 


Carbon Dioxide   (22-30)  mmol/L


 


Anion Gap   (5-15)  MEQ/L


 


BUN   (7-17)  mg/dL


 


Creatinine   (0.52-1.04)  mg/dL


 


Estimated GFR   ML/MIN


 


Glucose   ()  mg/dL


 


Lactic Acid   (0.4-2.0)  


 


Calcium   (8.4-10.2)  mg/dL


 


Total Bilirubin   (0.2-1.3)  mg/dL


 


AST   (14-36)  U/L


 


ALT   (0-35)  U/L


 


Alkaline Phosphatase   ()  U/L


 


Troponin I   (0.000-0.034)  ng/mL


 


Serum Total Protein   (6.3-8.2)  g/dL


 


Albumin   (3.5-5.0)  g/dL


 


Amylase   ()  U/L


 


Lipase   ()  U/L


 


Serum Pregnancy, Qual   (Negative)  


 


Ur Collection Type  CLEAN CATCH  


 


Urine Color  YELLOW  (YELLOW)  


 


Urine Appearance  SLIGHTLY CLOUDY  (CLEAR)  


 


Urine pH  5.0  (5-6)  


 


Ur Specific Gravity  1.020  (1.005-1.025)  


 


Urine Protein  NEGATIVE  (Negative)  


 


Urine Ketones  MODERATE  (NEGATIVE)  


 


Urine Blood  NEGATIVE  (0-5)  Luis Manuel/ul


 


Urine Nitrite  NEGATIVE  (NEGATIVE)  


 


Urine Bilirubin  NEGATIVE  (NEGATIVE)  


 


Urine Urobilinogen  NORMAL  (0-1)  mg/dL


 


Ur Leukocyte Esterase  NEGATIVE  (NEGATIVE)  


 


Urine Culture Reflexed  NO  (NO)  


 


Urine Glucose  NEGATIVE  (NEGATIVE)  mg/dL


 


Urine Opiates Level   (NEGATIVE)  


 


Ur Methadone   (NEGATIVE)  


 


Urine Barbiturates   (NEGATIVE)  


 


Ur Phencyclidine (PCP)   (NEGATIVE)  


 


Urine Amphetamine   (NEGATIVE)  


 


U Benzodiazepine Level   (NEGATIVE)  


 


Urine Cocaine   (NEGATIVE)  


 


Urine Marijuana (THC)   (NEGATIVE)  


 


Specimen Received  18 [id number]  














- Progress


Progress: improved


Progress Note: 





18 02:27


I called Dr Johnson, discussed our results and this patient's current condition

, he agreed to admit her for observation, informed patient and her daughter and 

they agreed.


Discussed with .: Rodney


Will see patient in: hospital (observation)


Counseled pt/family regarding: lab results, diagnosis, need for follow-up, rad 

results





- Departure


Time of Disposition: 02:28


Departure Disposition: Observation


Clinical Impression: 


Vomiting


Qualifiers:


 Vomiting type: unspecified Vomiting Intractability: non-intractable Nausea 

presence: with nausea Qualified Code(s): R11.2 - Nausea with vomiting, 

unspecified





Condition: Stable


Critical Care Time: No


Referrals: 


MARIS JOHNSON MD [Primary Care Provider] -

## 2018-06-24 LAB
ALBUMIN SERPL-MCNC: 3.4 G/DL (ref 3.5–5)
ALBUMIN SERPL-MCNC: 4.3 G/DL (ref 3.5–5)
ALP SERPL-CCNC: 39 U/L (ref 38–126)
ALP SERPL-CCNC: 48 U/L (ref 38–126)
ALT SERPL-CCNC: 14 U/L (ref 0–35)
ALT SERPL-CCNC: 17 U/L (ref 0–35)
AMYLASE SERPL-CCNC: 48 U/L (ref 30–110)
AMYLASE SERPL-CCNC: 66 U/L (ref 30–110)
ANION GAP SERPL CALC-SCNC: 11.1 MEQ/L (ref 5–15)
ANION GAP SERPL CALC-SCNC: 11.8 MEQ/L (ref 5–15)
AST SERPL QL: 16 U/L (ref 14–36)
AST SERPL QL: 21 U/L (ref 14–36)
BASOPHILS # BLD AUTO: 0.01 10*3/UL (ref 0–0.4)
BASOPHILS NFR BLD AUTO: 0.2 % (ref 0–0.4)
BILIRUB BLD-MCNC: 1.8 MG/DL (ref 0.2–1.3)
BILIRUB BLD-MCNC: 2 MG/DL (ref 0.2–1.3)
BUN SERPL-MCNC: 10 MG/DL (ref 7–17)
BUN SERPL-MCNC: 14 MG/DL (ref 7–17)
CALCIUM SPEC-MCNC: 8.7 MG/DL (ref 8.4–10.2)
CALCIUM SPEC-MCNC: 9.3 MG/DL (ref 8.4–10.2)
CHLORIDE SERPL-SCNC: 103 MMOL/L (ref 98–107)
CHLORIDE SERPL-SCNC: 107 MMOL/L (ref 98–107)
CO2 SERPL-SCNC: 26 MMOL/L (ref 22–30)
CO2 SERPL-SCNC: 27 MMOL/L (ref 22–30)
CREAT SERPL-MCNC: 0.66 MG/DL (ref 0.52–1.04)
CREAT SERPL-MCNC: 0.67 MG/DL (ref 0.52–1.04)
EOSINOPHIL # BLD AUTO: 0.09 10*3/UL (ref 0–0.5)
GLUCOSE SERPL-MCNC: 81 MG/DL (ref 74–106)
GLUCOSE SERPL-MCNC: 99 MG/DL (ref 74–106)
GRANULOCYTES # BLD AUTO: 1.46 10*3/UL (ref 1.4–6.9)
HCT VFR BLD AUTO: 34.5 % (ref 35–47)
HGB BLD-MCNC: 12.2 GM/DL (ref 12–16)
LIPASE SERPL-CCNC: 104 U/L (ref 23–300)
LIPASE SERPL-CCNC: 138 U/L (ref 23–300)
LYMPHOCYTES # SPEC AUTO: 2.16 10*3/UL (ref 1–4.6)
MCH RBC QN AUTO: 32.2 PG (ref 26–32)
MCHC RBC AUTO-ENTMCNC: 35.4 G/DL (ref 32–36)
MONOCYTES # BLD AUTO: 0.45 10*3/UL (ref 0–1.3)
NEUTROPHILS NFR BLD AUTO: 35 % (ref 36–66)
PLATELET # BLD AUTO: 172 K/MM3 (ref 150–450)
POTASSIUM SERPLBLD-SCNC: 3.7 MMOL/L (ref 3.5–5.1)
POTASSIUM SERPLBLD-SCNC: 4.1 MMOL/L (ref 3.5–5.1)
PROT SERPL-MCNC: 6.3 G/DL (ref 6.3–8.2)
PROT SERPL-MCNC: 7.6 G/DL (ref 6.3–8.2)
RBC # BLD AUTO: 3.78 M/MM3 (ref 4.1–5.4)
SODIUM SERPL-SCNC: 138 MMOL/L (ref 137–145)
SODIUM SERPL-SCNC: 140 MMOL/L (ref 137–145)
WBC # BLD AUTO: 4.2 K/MM3 (ref 4–10.5)

## 2018-06-24 RX ADMIN — ONDANSETRON PRN MG: 2 INJECTION, SOLUTION INTRAMUSCULAR; INTRAVENOUS at 22:41

## 2018-06-24 RX ADMIN — LEVOTHYROXINE SODIUM SCH: 75 TABLET ORAL at 10:51

## 2018-06-24 RX ADMIN — FAMOTIDINE SCH MG: 10 INJECTION INTRAVENOUS at 09:41

## 2018-06-24 RX ADMIN — HYDROCODONE BITARTRATE AND ACETAMINOPHEN PRN TAB: 10; 325 TABLET ORAL at 17:05

## 2018-06-24 RX ADMIN — FAMOTIDINE SCH MG: 10 INJECTION INTRAVENOUS at 22:40

## 2018-06-24 RX ADMIN — HYDROCODONE BITARTRATE AND ACETAMINOPHEN PRN TAB: 10; 325 TABLET ORAL at 10:49

## 2018-06-24 RX ADMIN — FENTANYL CITRATE PRN MCG: 50 INJECTION, SOLUTION INTRAMUSCULAR; INTRAVENOUS at 14:47

## 2018-06-24 RX ADMIN — LORAZEPAM PRN MG: 2 INJECTION, SOLUTION INTRAMUSCULAR; INTRAVENOUS at 12:34

## 2018-06-24 RX ADMIN — FENTANYL CITRATE PRN MCG: 50 INJECTION, SOLUTION INTRAMUSCULAR; INTRAVENOUS at 07:50

## 2018-06-24 RX ADMIN — HYDROCODONE BITARTRATE AND ACETAMINOPHEN PRN TAB: 10; 325 TABLET ORAL at 04:11

## 2018-06-24 RX ADMIN — FENTANYL CITRATE PRN MCG: 50 INJECTION, SOLUTION INTRAMUSCULAR; INTRAVENOUS at 20:47

## 2018-06-24 RX ADMIN — ONDANSETRON PRN MG: 2 INJECTION, SOLUTION INTRAMUSCULAR; INTRAVENOUS at 17:05

## 2018-06-24 NOTE — XRAY
Indication: Abdominal pain, nausea, and vomiting.  Pancreatitis.



Multiple contiguous axial images obtained through the abdomen and pelvis using

80 cc Isovue 370 contrast only.



Comparison: None



Lung bases demonstrates minimal bibasilar dependent atelectasis and tiny right

base calcified granuloma.  No infiltrate or effusion.  Heart is not enlarged.



Noncontrasted stomach and bowel loops appear nonobstructed.  A few left

abdomen fluid distended small bowel loops with wall thickening and fluid

leveling possible enteritis.  Normal appendix.  Previous cholecystectomy.  1.5

cm right ovary cyst.  Splenic calcified granuloma.  No free fluid/air.



Remaining liver, pancreas, spleen, adrenal glands, kidneys, ureters, lateral,

ureters, and aorta appear unremarkable.  No pathologic retroperitoneal

lymphadenopathy.



Osseous structures intact.



Impression:

1.  Left abdomen fluid distended small bowel loops with wall thickening.  Rule

out enteritis.

2.  Incidental dominant right ovary cyst and evidence for old granulomatous

disease.



Comment: Preliminary interpretation was made by [facility].  No critical discrepancy



CTDI 15.32

## 2018-06-24 NOTE — PCM.HP
History of Present Illness





- Chief Complaint


Chief Complaint: Vomiting


History of Present Illness: 


 is a 33 year old female.Pt started c/o upper abdominal pain, nausea, 

vomiting 3 days ago. She denies fever, chills, diarrhea, vomiting blood or 

coffeeground material. She has been diagnosed with dysfunctional Oddi sphincter

, pancreatitis, underwent cholecystectomy and in 10/2017 endoscopic Oddi 

sphincerotomy. 








- Review of Systems


Constitutional: No Fever, No Chills


Eyes: No Symptoms


Ears, Nose, & Throat: No Symptoms


Respiratory: No Cough, No Short Of Breath


Cardiac: No Chest Pain, No Edema, No Syncope


Abdominal/Gastrointestinal: No Abdominal Pain, No Nausea, No Vomiting, No 

Diarrhea


Genitourinary Symptoms: No Dysuria


Musculoskeletal: No Back Pain, No Neck Pain


Skin: No Rash


Neurological: No Dizziness, No Focal Weakness, No Sensory Changes


Psychological: No Symptoms


Endocrine: No Symptoms


Hematologic/Lymphatic: No Symptoms


Immunological/Allergic: No Symptoms





Medications & Allergies


Home Medications: 


 Home Medication List





Levothyroxine Sodium 50 Mcg*** [Synthroid 50 Mcg***] 50 mcg PO DAILY 01/04/17 [

History Confirmed 06/24/18]


Hydrocodone Bit/Acetaminophen [Norco 5-325 Tablet] 1 tablet PO Q4-6HPRN PRN 06/ 24/18 [History Confirmed 06/24/18]








Allergies/Adverse Reactions: 


 Allergies











Allergy/AdvReac Type Severity Reaction Status Date / Time


 


No Known Drug Allergies Allergy   Verified 06/24/18 03:08














- Past Medical History


Past Medical History: Yes


Neurological History: Other


ENT History: Other


Cardiac History: No Pertinent History


Respiratory History: No Pertinent History


Endocrine Medical History: Hypothyroidism


Musculoskelatal History: No Pertinent History


GI Medical History: Other


 History: No Pertinent History


Pyscho-Social History: No Pertinent History


Reproductive Disorders: No Pertinent History


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 

whe she has pain. legally blind





- Female History


Hx Last Menstrual Period: 06/15/2018


Are you pregnant now?: No





- Past Surgical History


Past Surgical History: Yes


Neuro Surgical History: No Pertinent History


Cardiac History: No Pertinent History


Respiratory Surgery: No Pertinent History


GI Surgical History: Other


Genitourinary Surgical Hx: No Pertinent History


Musculskeletal Surgical Hx: No Pertinent History


Female Surgical History: Tubal Ligation, Other


Other Surgical History: vaginal reconstruction.  tubal.  sphincterotomy





- Social History


Smoking Status: Never smoker


Exposure to second hand smoke: No


Alcohol: None


Drug Use: none





- Physical Exam


Vital Signs: 


 Vital Signs - 24 hr











  Temp Pulse Resp BP Pulse Ox


 


 06/24/18 07:45     102/68 


 


 06/24/18 07:03  97.9 F  57 L  18  88/58  98


 


 06/24/18 03:20  98.0 F  67  16  102/58  97


 


 06/24/18 02:28      100


 


 06/24/18 00:40   69  16  107/58  100


 


 06/23/18 23:46   62  16  109/69  100


 


 06/23/18 23:25   68  16  110/73  98


 


 06/23/18 22:38  98.4 F  66  16  105/62  100











General Appearance: no apparent distress, alert


Neurologic Exam: alert, oriented x 3, cooperative, normal mood/affect, nml 

cerebellar function, nml station & gait, sensation nml, No motor deficits


Eye Exam: PERRL/EOMI, eyes nml inspection


Ears, Nose, Throat Exam: normal ENT inspection, TMs normal, pharynx normal, 

moist mucous membranes


Neck Exam: normal inspection, non-tender, supple, full range of motion


Respiratory Exam: normal breath sounds, lungs clear, No respiratory distress


Cardiovascular Exam: regular rate/rhythm, normal heart sounds, normal 

peripheral pulses


Gastrointestinal/Abdomen Exam: soft, normal bowel sounds, No tenderness, No mass


Back Exam: normal inspection, normal range of motion, No CVA tenderness, No 

vertebral tenderness


Extremity Exam: normal inspection, normal range of motion, pelvis stable


Skin Exam: normal color, warm, dry, No rash


Lymphatic Exam: No adenopathy





Results





- Labs


Lab/Micro Results: 


 Lab Results-Last 24 Hours











  06/23/18 06/23/18 06/23/18 Range/Units





  22:50 22:50 23:05 


 


WBC     (4.0-10.5)  K/mm3


 


RBC     (4.1-5.4)  M/mm3


 


Hgb     (12.0-16.0)  gm/dl


 


Hct     (35-47)  %


 


MCV     ()  fl


 


MCH     (26-32)  pg


 


MCHC     (32-36)  g/dl


 


RDW     (11.5-14.0)  %


 


Plt Count     (150-450)  K/mm3


 


MPV     (6-9.5)  fl


 


Gran %     (36.0-66.0)  %


 


Eos # (Auto)     (0-0.5)  


 


Absolute Lymphs (auto)     (1.0-4.6)  


 


Absolute Monos (auto)     (0.0-1.3)  


 


Lymphocytes %     (24.0-44.0)  %


 


Monocytes %     (0.0-12.0)  %


 


Eosinophils %     (0.00-5.0)  %


 


Basophils %     (0.0-0.4)  %


 


Absolute Granulocytes     (1.4-6.9)  


 


Basophils #     (0-0.4)  


 


Sodium     (137-145)  mmol/L


 


Potassium     (3.5-5.1)  mmol/L


 


Chloride     ()  mmol/L


 


Carbon Dioxide     (22-30)  mmol/L


 


Anion Gap     (5-15)  MEQ/L


 


BUN     (7-17)  mg/dL


 


Creatinine     (0.52-1.04)  mg/dL


 


Estimated GFR     ML/MIN


 


Glucose     ()  mg/dL


 


Lactic Acid    0.8  (0.4-2.0)  


 


Calcium     (8.4-10.2)  mg/dL


 


Total Bilirubin     (0.2-1.3)  mg/dL


 


AST     (14-36)  U/L


 


ALT     (0-35)  U/L


 


Alkaline Phosphatase     ()  U/L


 


Troponin I     (0.000-0.034)  ng/mL


 


Serum Total Protein     (6.3-8.2)  g/dL


 


Albumin     (3.5-5.0)  g/dL


 


Amylase     ()  U/L


 


Lipase     ()  U/L


 


TSH 3rd Generation     (0.47-4.68)  mIU/L


 


Serum Pregnancy, Qual     (Negative)  


 


Ur Collection Type  CLEAN CATCH    


 


Urine Color  YELLOW    (YELLOW)  


 


Urine Appearance  SLIGHTLY CLOUDY    (CLEAR)  


 


Urine pH  5.0    (5-6)  


 


Ur Specific Gravity  1.020    (1.005-1.025)  


 


Urine Protein  NEGATIVE    (Negative)  


 


Urine Ketones  MODERATE    (NEGATIVE)  


 


Urine Blood  NEGATIVE    (0-5)  Luis Manuel/ul


 


Urine Nitrite  NEGATIVE    (NEGATIVE)  


 


Urine Bilirubin  NEGATIVE    (NEGATIVE)  


 


Urine Urobilinogen  NORMAL    (0-1)  mg/dL


 


Ur Leukocyte Esterase  NEGATIVE    (NEGATIVE)  


 


Urine Culture Reflexed  NO    (NO)  


 


Urine Glucose  NEGATIVE    (NEGATIVE)  mg/dL


 


Urine Opiates Level   POSITIVE   (NEGATIVE)  


 


Ur Methadone   NEGATIVE   (NEGATIVE)  


 


Urine Barbiturates   NEGATIVE   (NEGATIVE)  


 


Ur Phencyclidine (PCP)   NEGATIVE   (NEGATIVE)  


 


Urine Amphetamine   NEGATIVE   (NEGATIVE)  


 


U Benzodiazepine Level   NEGATIVE   (NEGATIVE)  


 


Urine Cocaine   NEGATIVE   (NEGATIVE)  


 


Urine Marijuana (THC)   NEGATIVE   (NEGATIVE)  


 


Specimen Received  06/23/18 2250    














  06/23/18 06/23/18 06/23/18 Range/Units





  23:10 23:10 23:10 


 


WBC  4.1    (4.0-10.5)  K/mm3


 


RBC  3.96 L    (4.1-5.4)  M/mm3


 


Hgb  12.8    (12.0-16.0)  gm/dl


 


Hct  35.8    (35-47)  %


 


MCV  90.4    ()  fl


 


MCH  32.3 H    (26-32)  pg


 


MCHC  35.8    (32-36)  g/dl


 


RDW  12.0    (11.5-14.0)  %


 


Plt Count  199    (150-450)  K/mm3


 


MPV  10.9 H    (6-9.5)  fl


 


Gran %  52.5    (36.0-66.0)  %


 


Eos # (Auto)  0.06    (0-0.5)  


 


Absolute Lymphs (auto)  1.43    (1.0-4.6)  


 


Absolute Monos (auto)  0.44    (0.0-1.3)  


 


Lymphocytes %  35.0    (24.0-44.0)  %


 


Monocytes %  10.8    (0.0-12.0)  %


 


Eosinophils %  1.5    (0.00-5.0)  %


 


Basophils %  0.2    (0.0-0.4)  %


 


Absolute Granulocytes  2.14    (1.4-6.9)  


 


Basophils #  0.01    (0-0.4)  


 


Sodium   138   (137-145)  mmol/L


 


Potassium   3.7   (3.5-5.1)  mmol/L


 


Chloride   103   ()  mmol/L


 


Carbon Dioxide   27   (22-30)  mmol/L


 


Anion Gap   11.8   (5-15)  MEQ/L


 


BUN   14   (7-17)  mg/dL


 


Creatinine   0.67   (0.52-1.04)  mg/dL


 


Estimated GFR   > 60.0   ML/MIN


 


Glucose   99   ()  mg/dL


 


Lactic Acid     (0.4-2.0)  


 


Calcium   9.3   (8.4-10.2)  mg/dL


 


Total Bilirubin   2.00 H   (0.2-1.3)  mg/dL


 


AST   21   (14-36)  U/L


 


ALT   17   (0-35)  U/L


 


Alkaline Phosphatase   48   ()  U/L


 


Troponin I    < 0.012  (0.000-0.034)  ng/mL


 


Serum Total Protein   7.6   (6.3-8.2)  g/dL


 


Albumin   4.3   (3.5-5.0)  g/dL


 


Amylase   66   ()  U/L


 


Lipase   138   ()  U/L


 


TSH 3rd Generation     (0.47-4.68)  mIU/L


 


Serum Pregnancy, Qual     (Negative)  


 


Ur Collection Type     


 


Urine Color     (YELLOW)  


 


Urine Appearance     (CLEAR)  


 


Urine pH     (5-6)  


 


Ur Specific Gravity     (1.005-1.025)  


 


Urine Protein     (Negative)  


 


Urine Ketones     (NEGATIVE)  


 


Urine Blood     (0-5)  Luis Manuel/ul


 


Urine Nitrite     (NEGATIVE)  


 


Urine Bilirubin     (NEGATIVE)  


 


Urine Urobilinogen     (0-1)  mg/dL


 


Ur Leukocyte Esterase     (NEGATIVE)  


 


Urine Culture Reflexed     (NO)  


 


Urine Glucose     (NEGATIVE)  mg/dL


 


Urine Opiates Level     (NEGATIVE)  


 


Ur Methadone     (NEGATIVE)  


 


Urine Barbiturates     (NEGATIVE)  


 


Ur Phencyclidine (PCP)     (NEGATIVE)  


 


Urine Amphetamine     (NEGATIVE)  


 


U Benzodiazepine Level     (NEGATIVE)  


 


Urine Cocaine     (NEGATIVE)  


 


Urine Marijuana (THC)     (NEGATIVE)  


 


Specimen Received     














  06/23/18 06/24/18 06/24/18 Range/Units





  23:10 02:00 05:25 


 


WBC     (4.0-10.5)  K/mm3


 


RBC     (4.1-5.4)  M/mm3


 


Hgb     (12.0-16.0)  gm/dl


 


Hct     (35-47)  %


 


MCV     ()  fl


 


MCH     (26-32)  pg


 


MCHC     (32-36)  g/dl


 


RDW     (11.5-14.0)  %


 


Plt Count     (150-450)  K/mm3


 


MPV     (6-9.5)  fl


 


Gran %     (36.0-66.0)  %


 


Eos # (Auto)     (0-0.5)  


 


Absolute Lymphs (auto)     (1.0-4.6)  


 


Absolute Monos (auto)     (0.0-1.3)  


 


Lymphocytes %     (24.0-44.0)  %


 


Monocytes %     (0.0-12.0)  %


 


Eosinophils %     (0.00-5.0)  %


 


Basophils %     (0.0-0.4)  %


 


Absolute Granulocytes     (1.4-6.9)  


 


Basophils #     (0-0.4)  


 


Sodium     (137-145)  mmol/L


 


Potassium     (3.5-5.1)  mmol/L


 


Chloride     ()  mmol/L


 


Carbon Dioxide     (22-30)  mmol/L


 


Anion Gap     (5-15)  MEQ/L


 


BUN     (7-17)  mg/dL


 


Creatinine     (0.52-1.04)  mg/dL


 


Estimated GFR     ML/MIN


 


Glucose     ()  mg/dL


 


Lactic Acid     (0.4-2.0)  


 


Calcium     (8.4-10.2)  mg/dL


 


Total Bilirubin     (0.2-1.3)  mg/dL


 


AST     (14-36)  U/L


 


ALT     (0-35)  U/L


 


Alkaline Phosphatase     ()  U/L


 


Troponin I   < 0.012  < 0.012  (0.000-0.034)  ng/mL


 


Serum Total Protein     (6.3-8.2)  g/dL


 


Albumin     (3.5-5.0)  g/dL


 


Amylase     ()  U/L


 


Lipase     ()  U/L


 


TSH 3rd Generation     (0.47-4.68)  mIU/L


 


Serum Pregnancy, Qual  NEGATIVE    (Negative)  


 


Ur Collection Type     


 


Urine Color     (YELLOW)  


 


Urine Appearance     (CLEAR)  


 


Urine pH     (5-6)  


 


Ur Specific Gravity     (1.005-1.025)  


 


Urine Protein     (Negative)  


 


Urine Ketones     (NEGATIVE)  


 


Urine Blood     (0-5)  Luis Manuel/ul


 


Urine Nitrite     (NEGATIVE)  


 


Urine Bilirubin     (NEGATIVE)  


 


Urine Urobilinogen     (0-1)  mg/dL


 


Ur Leukocyte Esterase     (NEGATIVE)  


 


Urine Culture Reflexed     (NO)  


 


Urine Glucose     (NEGATIVE)  mg/dL


 


Urine Opiates Level     (NEGATIVE)  


 


Ur Methadone     (NEGATIVE)  


 


Urine Barbiturates     (NEGATIVE)  


 


Ur Phencyclidine (PCP)     (NEGATIVE)  


 


Urine Amphetamine     (NEGATIVE)  


 


U Benzodiazepine Level     (NEGATIVE)  


 


Urine Cocaine     (NEGATIVE)  


 


Urine Marijuana (THC)     (NEGATIVE)  


 


Specimen Received     














  06/24/18 06/24/18 06/24/18 Range/Units





  05:25 05:25 05:25 


 


WBC  4.2    (4.0-10.5)  K/mm3


 


RBC  3.78 L    (4.1-5.4)  M/mm3


 


Hgb  12.2    (12.0-16.0)  gm/dl


 


Hct  34.5 L    (35-47)  %


 


MCV  91.3    ()  fl


 


MCH  32.2 H    (26-32)  pg


 


MCHC  35.4    (32-36)  g/dl


 


RDW  12.0    (11.5-14.0)  %


 


Plt Count  172    (150-450)  K/mm3


 


MPV  11.2 H    (6-9.5)  fl


 


Gran %  35.0 L    (36.0-66.0)  %


 


Eos # (Auto)  0.09    (0-0.5)  


 


Absolute Lymphs (auto)  2.16    (1.0-4.6)  


 


Absolute Monos (auto)  0.45    (0.0-1.3)  


 


Lymphocytes %  51.8 H    (24.0-44.0)  %


 


Monocytes %  10.8    (0.0-12.0)  %


 


Eosinophils %  2.2    (0.00-5.0)  %


 


Basophils %  0.2    (0.0-0.4)  %


 


Absolute Granulocytes  1.46    (1.4-6.9)  


 


Basophils #  0.01    (0-0.4)  


 


Sodium   140   (137-145)  mmol/L


 


Potassium   4.1   (3.5-5.1)  mmol/L


 


Chloride   107   ()  mmol/L


 


Carbon Dioxide   26   (22-30)  mmol/L


 


Anion Gap   11.1   (5-15)  MEQ/L


 


BUN   10   (7-17)  mg/dL


 


Creatinine   0.66   (0.52-1.04)  mg/dL


 


Estimated GFR   > 60.0   ML/MIN


 


Glucose   81   ()  mg/dL


 


Lactic Acid     (0.4-2.0)  


 


Calcium   8.7   (8.4-10.2)  mg/dL


 


Total Bilirubin   1.80 H   (0.2-1.3)  mg/dL


 


AST   16   (14-36)  U/L


 


ALT   14   (0-35)  U/L


 


Alkaline Phosphatase   39   ()  U/L


 


Troponin I     (0.000-0.034)  ng/mL


 


Serum Total Protein   6.3   (6.3-8.2)  g/dL


 


Albumin   3.4 L   (3.5-5.0)  g/dL


 


Amylase     ()  U/L


 


Lipase     ()  U/L


 


TSH 3rd Generation    7.850 H  (0.47-4.68)  mIU/L


 


Serum Pregnancy, Qual     (Negative)  


 


Ur Collection Type     


 


Urine Color     (YELLOW)  


 


Urine Appearance     (CLEAR)  


 


Urine pH     (5-6)  


 


Ur Specific Gravity     (1.005-1.025)  


 


Urine Protein     (Negative)  


 


Urine Ketones     (NEGATIVE)  


 


Urine Blood     (0-5)  Luis Manuel/ul


 


Urine Nitrite     (NEGATIVE)  


 


Urine Bilirubin     (NEGATIVE)  


 


Urine Urobilinogen     (0-1)  mg/dL


 


Ur Leukocyte Esterase     (NEGATIVE)  


 


Urine Culture Reflexed     (NO)  


 


Urine Glucose     (NEGATIVE)  mg/dL


 


Urine Opiates Level     (NEGATIVE)  


 


Ur Methadone     (NEGATIVE)  


 


Urine Barbiturates     (NEGATIVE)  


 


Ur Phencyclidine (PCP)     (NEGATIVE)  


 


Urine Amphetamine     (NEGATIVE)  


 


U Benzodiazepine Level     (NEGATIVE)  


 


Urine Cocaine     (NEGATIVE)  


 


Urine Marijuana (THC)     (NEGATIVE)  


 


Specimen Received     














  06/24/18 06/24/18 06/24/18 Range/Units





  05:25 05:32 08:12 


 


WBC     (4.0-10.5)  K/mm3


 


RBC     (4.1-5.4)  M/mm3


 


Hgb     (12.0-16.0)  gm/dl


 


Hct     (35-47)  %


 


MCV     ()  fl


 


MCH     (26-32)  pg


 


MCHC     (32-36)  g/dl


 


RDW     (11.5-14.0)  %


 


Plt Count     (150-450)  K/mm3


 


MPV     (6-9.5)  fl


 


Gran %     (36.0-66.0)  %


 


Eos # (Auto)     (0-0.5)  


 


Absolute Lymphs (auto)     (1.0-4.6)  


 


Absolute Monos (auto)     (0.0-1.3)  


 


Lymphocytes %     (24.0-44.0)  %


 


Monocytes %     (0.0-12.0)  %


 


Eosinophils %     (0.00-5.0)  %


 


Basophils %     (0.0-0.4)  %


 


Absolute Granulocytes     (1.4-6.9)  


 


Basophils #     (0-0.4)  


 


Sodium     (137-145)  mmol/L


 


Potassium     (3.5-5.1)  mmol/L


 


Chloride     ()  mmol/L


 


Carbon Dioxide     (22-30)  mmol/L


 


Anion Gap     (5-15)  MEQ/L


 


BUN     (7-17)  mg/dL


 


Creatinine     (0.52-1.04)  mg/dL


 


Estimated GFR     ML/MIN


 


Glucose     ()  mg/dL


 


Lactic Acid   0.7   (0.4-2.0)  


 


Calcium     (8.4-10.2)  mg/dL


 


Total Bilirubin     (0.2-1.3)  mg/dL


 


AST     (14-36)  U/L


 


ALT     (0-35)  U/L


 


Alkaline Phosphatase     ()  U/L


 


Troponin I    < 0.012  (0.000-0.034)  ng/mL


 


Serum Total Protein     (6.3-8.2)  g/dL


 


Albumin     (3.5-5.0)  g/dL


 


Amylase  48    ()  U/L


 


Lipase  104    ()  U/L


 


TSH 3rd Generation     (0.47-4.68)  mIU/L


 


Serum Pregnancy, Qual     (Negative)  


 


Ur Collection Type     


 


Urine Color     (YELLOW)  


 


Urine Appearance     (CLEAR)  


 


Urine pH     (5-6)  


 


Ur Specific Gravity     (1.005-1.025)  


 


Urine Protein     (Negative)  


 


Urine Ketones     (NEGATIVE)  


 


Urine Blood     (0-5)  Luis Manuel/ul


 


Urine Nitrite     (NEGATIVE)  


 


Urine Bilirubin     (NEGATIVE)  


 


Urine Urobilinogen     (0-1)  mg/dL


 


Ur Leukocyte Esterase     (NEGATIVE)  


 


Urine Culture Reflexed     (NO)  


 


Urine Glucose     (NEGATIVE)  mg/dL


 


Urine Opiates Level     (NEGATIVE)  


 


Ur Methadone     (NEGATIVE)  


 


Urine Barbiturates     (NEGATIVE)  


 


Ur Phencyclidine (PCP)     (NEGATIVE)  


 


Urine Amphetamine     (NEGATIVE)  


 


U Benzodiazepine Level     (NEGATIVE)  


 


Urine Cocaine     (NEGATIVE)  


 


Urine Marijuana (THC)     (NEGATIVE)  


 


Specimen Received     














- Radiology Impressions


Radiology Exams & Impressions: 


 Radiology Procedures











 Category Date Time Status


 


 ABDOMEN AND PELVIS W CONTRAST [CT] Stat Exams  06/23/18 22:50 Completed














Assessment/Plan


(1) Vomiting


Current Visit: Yes   Status: Acute   


Qualifiers: 


   Vomiting type: unspecified   Vomiting Intractability: non-intractable   

Nausea presence: with nausea   Qualified Code(s): R11.2 - Nausea with vomiting, 

unspecified   


Code(s): R11.10 - VOMITING, UNSPECIFIED

## 2018-06-25 VITALS — OXYGEN SATURATION: 98 % | DIASTOLIC BLOOD PRESSURE: 56 MMHG | HEART RATE: 76 BPM | SYSTOLIC BLOOD PRESSURE: 105 MMHG

## 2018-06-25 LAB
ALBUMIN SERPL-MCNC: 3.6 G/DL (ref 3.5–5)
ALP SERPL-CCNC: 41 U/L (ref 38–126)
ALT SERPL-CCNC: 14 U/L (ref 0–35)
AMYLASE SERPL-CCNC: 68 U/L (ref 30–110)
ANION GAP SERPL CALC-SCNC: 16.5 MEQ/L (ref 5–15)
AST SERPL QL: 18 U/L (ref 14–36)
BILIRUB BLD-MCNC: 1.9 MG/DL (ref 0.2–1.3)
BUN SERPL-MCNC: 9 MG/DL (ref 7–17)
CALCIUM SPEC-MCNC: 8.4 MG/DL (ref 8.4–10.2)
CHLORIDE SERPL-SCNC: 109 MMOL/L (ref 98–107)
CO2 SERPL-SCNC: 17 MMOL/L (ref 22–30)
CREAT SERPL-MCNC: 0.65 MG/DL (ref 0.52–1.04)
GLUCOSE SERPL-MCNC: 39 MG/DL (ref 74–106)
LIPASE SERPL-CCNC: 226 U/L (ref 23–300)
POTASSIUM SERPLBLD-SCNC: 4.1 MMOL/L (ref 3.5–5.1)
PROT SERPL-MCNC: 6.7 G/DL (ref 6.3–8.2)
SODIUM SERPL-SCNC: 138 MMOL/L (ref 137–145)

## 2018-06-25 RX ADMIN — DEXTROSE AND SODIUM CHLORIDE SCH MLS/HR: 5; 900 INJECTION, SOLUTION INTRAVENOUS at 10:52

## 2018-06-25 RX ADMIN — FENTANYL CITRATE PRN MCG: 50 INJECTION, SOLUTION INTRAMUSCULAR; INTRAVENOUS at 06:51

## 2018-06-25 RX ADMIN — HYDROCODONE BITARTRATE AND ACETAMINOPHEN PRN TAB: 10; 325 TABLET ORAL at 14:32

## 2018-06-25 RX ADMIN — ONDANSETRON PRN MG: 2 INJECTION, SOLUTION INTRAMUSCULAR; INTRAVENOUS at 10:14

## 2018-06-25 RX ADMIN — LORAZEPAM PRN MG: 2 INJECTION, SOLUTION INTRAMUSCULAR; INTRAVENOUS at 08:01

## 2018-06-25 RX ADMIN — LEVOTHYROXINE SODIUM SCH MCG: 75 TABLET ORAL at 09:53

## 2018-06-25 RX ADMIN — DEXTROSE AND SODIUM CHLORIDE SCH MLS/HR: 5; 900 INJECTION, SOLUTION INTRAVENOUS at 16:13

## 2018-06-25 RX ADMIN — FAMOTIDINE SCH MG: 10 INJECTION INTRAVENOUS at 09:53

## 2018-06-25 RX ADMIN — HYDROCODONE BITARTRATE AND ACETAMINOPHEN PRN TAB: 10; 325 TABLET ORAL at 02:13

## 2018-06-25 NOTE — XRAY
Exam: CT of the abdomen and pelvis without and with IV contrast from

6/25/2018.              CTDI:  27.29



Comparison: CT of the abdomen and pelvis with IV contrast from 6/24/2018.



Indication: 33-year-old female with enteritis, nausea/vomiting.  The patient

has history of prior cholecystectomy, tubal ligation, and a bladder procedure.



Technique: Non-IV contrast axial images were obtained through the abdomen down

to the upper pelvis.  Oral contrast was given as well.  Subsequently, post IV

contrast axial images were obtained through the abdomen and pelvis during and

following automated injection of 80 cc of Isovue-370 contrast material.

Reconstructed coronal and sagittal images were created and reviewed.  In

addition, delayed images were obtained through the kidneys, ureters, and

bladder.



Findings: I believe there is a tiny granuloma at the lateral right lung base

on image #1.  Scant linear atelectasis is seen within the posterior lung

sulci.  No posterior pleural fluid or infiltrate is seen.  The heart size is

normal.



The remainder of the non-IV contrast study reveals surgical clips within the

right upper quadrant consistent with prior cholecystectomy.  I see no renal

calculi or hydronephrosis.  Most of the oral contrast is seen within the

stomach and proximal duodenum.  However, there is a minimal amount of contrast

seen within the right lower quadrant in the cecum.  Incidentally, the appendix

appears unremarkable.



The liver and spleen are remarkable only for a single calcified splenic

granuloma.  No biliary duct distention is seen.  The pancreas appears normal.

The adrenal glands are of normal size and configuration.  Both kidneys

function on delayed images.  There is no evidence of renal mass or

hydronephrosis.  The ureters and urinary bladder are remarkable for

significant distention of the urinary bladder which rises to the upper L5

level on the midline sagittal image.  This measures at least 16.2 cm in

height, 7.9 cm in AP dimension, and 11.1 cm in width.  Correlate clinically

regarding the need for bladder decompression.



The abdominal aorta reveals no aneurysm.  No abnormal retroperitoneal

lymphadenopathy is seen.  There is no evidence of free intraperitoneal air.

No ventral bowel containing hernia is seen.



The bowel appears nonobstructed.  The appendix appears of normal diameter and

reveals no appendiceal wall thickening.  I again see mild accentuation of the

markings within the small bowel mesentery which could represent edema.  This

is nonspecific.  Consider enteritis.



The uterus is anteflexed.  I note a 1.8 cm in diameter dominant follicle or

small ovarian cyst on axial image #50 of series #3.  This is similar to

yesterday's CT scan.  No other pelvic adnexal abnormality is seen.  No

enlarged pelvic lymph nodes are seen.  The left ovary appears unremarkable.

No significant free intraperitoneal fluid is seen.  There may be a tiny amount

of nonspecific free fluid.  This is unchanged.  A few tiny calcified

phleboliths are seen within the lower pelvis.



The skeleton reveals no acute fracture or aggressive bone lesion.



Impression:



1.  There is significant urinary bladder distention which extends to the upper

aspect of L5, as discussed above.  Consider urinary bladder decompression with

a Bonds catheter if the patient is unable to void.



2.  I see no evidence of bowel obstruction.  There again appears to be mild

increased markings within the small bowel mesentery which might represent some

nonspecific mesenteric edema.  Consider enteritis.



3.  Status post cholecystectomy.



4.  I see no findings to suggest acute appendicitis.



5.  1.8 cm in diameter dominant follicle versus small cyst within right ovary.

 This is similar to yesterday's CT study.



6.  No other acute process is seen within the abdomen or pelvis.

## 2018-06-25 NOTE — PCM.NOTE
Date and Time: 06/25/18 0855





Subjective Assessment: 





doing ok, still not eating anything, c/o abdominal pain





- Review of Systems


Constitutional: No Fever, No Chills


Eyes: No Symptoms


Ears, Nose, & Throat: No Symptoms


Respiratory: No Cough, No Short Of Breath


Cardiac: No Chest Pain, No Edema, No Syncope


Abdominal/Gastrointestinal: Abdominal Pain, Nausea, Vomiting, No Diarrhea


Genitourinary Symptoms: No Dysuria


Musculoskeletal: No Back Pain, No Neck Pain


Skin: No Rash


Neurological: No Dizziness, No Focal Weakness, No Sensory Changes


Psychological: No Symptoms


Endocrine: No Symptoms


Hematologic/Lymphatic: No Symptoms


Immunological/Allergic: No Symptoms





Objective Exam


General Appearance: no apparent distress, alert


Neurologic Exam: alert, oriented x 3, cooperative, normal mood/affect, nml 

cerebellar function, sensation nml, No motor deficits


Skin Exam: normal color, warm, dry


Eye Exam: PERRL, EOMI, eyes nml inspection


Ears, Nose, Throat Exam: normal ENT inspection, pharynx normal, moist mucous 

membranes


Neck Exam: normal inspection, non-tender, supple, full range of motion


Respiratory Exam: normal breath sounds, lungs clear, No respiratory distress


Cardiovascular Exam: regular rate/rhythm, normal heart sounds


Gastrointestinal/Abdomen Exam: soft, No tenderness, No mass


Extremity Exam: normal inspection, normal range of motion


Back Exam: normal inspection, normal range of motion, No CVA tenderness, No 

vertebral tenderness


Pelvic Exam: deferred


Rectal Exam: deferred





OBJECTIVE DATA


Vital Signs: 


 Vital Signs - 24 hr











  Temp Pulse Resp BP Pulse Ox


 


 06/25/18 07:28  98.2 F  65  16  110/56  97


 


 06/25/18 04:57  98.1 F  80  16  92/46  97


 


 06/24/18 23:33  98.4 F  63  16  92/50  98


 


 06/24/18 19:40  98.2 F  83  14  90/44  98


 


 06/24/18 15:55  98 F  64  18  93/54  98


 


 06/24/18 11:32  98.1 F  54 L  18  85/41  95








 Pain Assessment - Last Documented











Pain Intensity                 6


 


Pain Scale Used                0-10 Pain Scale











Intake and Output: 


 Intake & Output











 06/22/18 06/23/18 06/24/18 06/25/18





 11:59 11:59 11:59 11:59


 


Intake Total   0 2705


 


Output Total    2500


 


Balance   0 205


 


Weight   74.9 kg 











Lab Results: 


 Lab Results-Last 24 Hours











  06/24/18 06/24/18 06/24/18 Range/Units





  05:25 08:12 11:04 


 


Troponin I   < 0.012  < 0.012  (0.000-0.034)  ng/mL


 


Amylase  48    ()  U/L


 


Lipase  104    ()  U/L











Radiology Exams: 


 Radiology Procedures











 Category Date Time Status


 


 ABDOMEN AND PELVIS W CONTRAST [CT] Stat Exams  06/23/18 22:50 Completed











Multi-Disciplinary Progress Notes: 


 Multi-Disciplinary Progress Notes





06/24/18 12:15 (created 06/24/18 12:28) Case Management Note by Zoe Dumont DR. ROUNDED AND EVALUATED.  LONG DISCUSSION WITH PT AND FAMILY 

REGARDING DX AND TREATMENT.  FAMILY VERBALIZED UNDERSTANDING AND ABLE TO REPEAT 

INFORMATION BACK.  PT REPORTS THAT SHE IS REALLY WANTING TO GO HOME.  DR. JOHNSON REPORTS THAT WILL START CLEAR LIQUIDS, IF ABLE TO TOLERATE FOR 3-4 

HOURS THEN PT MAY GO HOME.  DISCUSSED WITH PT THAT IF SHE GOES HOME SHE WILL 

NEED TO STAY ON FULL LIQUIDS AND ADVANCE DIET SLOWLY, ALSO, ENCOURAGED NOT TO 

EAT FATTY FOODS.  DENIES ADDNL NEEDS FOR DISCHARGE.  NORMALLY INDEPENDENT WITH 

ADL'S.  FAMILY AT BEDSIDE.





Initialized on 06/24/18 12:28 - END OF NOTE

















Assessment/Plan


(1) Vomiting


Current Visit: Yes   Status: Acute   Onset Date: ~06/25/18   


Qualifiers: 


   Vomiting type: unspecified   Vomiting Intractability: non-intractable   

Nausea presence: with nausea   Qualified Code(s): R11.2 - Nausea with vomiting, 

unspecified   


Code(s): R11.10 - VOMITING, UNSPECIFIED   





(2) Abdominal pain


Current Visit: Yes   Status: Acute   


Qualifiers: 


   Abdominal location: epigastric   Qualified Code(s): R10.13 - Epigastric pain

   


Code(s): R10.9 - UNSPECIFIED ABDOMINAL PAIN

## 2023-08-06 ENCOUNTER — HOSPITAL ENCOUNTER (EMERGENCY)
Dept: HOSPITAL 33 - ED | Age: 38
Discharge: HOME | End: 2023-08-06
Payer: MEDICARE

## 2023-08-06 VITALS
OXYGEN SATURATION: 100 % | RESPIRATION RATE: 20 BRPM | DIASTOLIC BLOOD PRESSURE: 61 MMHG | HEART RATE: 78 BPM | SYSTOLIC BLOOD PRESSURE: 109 MMHG

## 2023-08-06 VITALS — TEMPERATURE: 98.2 F

## 2023-08-06 DIAGNOSIS — S64.491A: ICD-10-CM

## 2023-08-06 DIAGNOSIS — S61.241A: Primary | ICD-10-CM

## 2023-08-06 DIAGNOSIS — Z28.310: ICD-10-CM
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
## 2023-08-06 DIAGNOSIS — Z23: ICD-10-CM
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
## 2023-08-06 PROCEDURE — 73130 X-RAY EXAM OF HAND: CPT

## 2023-08-06 PROCEDURE — 90715 TDAP VACCINE 7 YRS/> IM: CPT

## 2023-08-06 PROCEDURE — 99283 EMERGENCY DEPT VISIT LOW MDM: CPT

## 2023-08-06 PROCEDURE — 90471 IMMUNIZATION ADMIN: CPT

## 2023-08-06 NOTE — XRAY
CLINICAL HISTORY:metal  impaled left fingers

COMPARISON:None.

TECHNIQUE:X-ray left hand, PA, lateral, and oblique views.

FINDINGS:

No acute fracture or dislocation.

The intercarpal, carpometacarpal, metacarpophalangeal, and interphalangeal

joints are well maintained.



Bone density is within normal limits.

Cortical margins and trabecular markings of the osseous structures are

unremarkable.

IMPRESSION:

1. No acute osseous findings.

2. Normal X-ray of the left hand.



DISCLAIMER:A subtle bone abnormality or fracture may not be readily apparent

on x-rays, thus clinical correlation and further imaging including follow-up

CT, MRI, or follow-up x-rays are advised as needed

_____________________________________





Electronically Signed by: Crystal Garza MD. (08/06/2023 20:11:57 CST)

## 2023-08-06 NOTE — ERPHSYRPT
- History of Present Illness


Time Seen by Provider: 08/06/23 19:12


Source: patient, family, EMS


Exam Limitations: no limitations


Patient Subjective Stated Complaint: pt here for  blade to left index 

finger, no bledding noted


Triage Nursing Assessment: pt alert, walked in, in pain, resp easy, skin w/d/p, 

has  blade imbedded to left index finger


Physician History: 





pt impaled left index finger on mixer blade. 


Our ring cutter could not penetrate the metal so we called EMS for assistance 

and they were able to cut the blade off after which the blade came out easily. 

This wound is just proximal to the dispa extensor joint. flex/ext prox and 

distal phalanx all intact. 


Discussed with pt the some injury of the extensor tendon is still likely and 

needs f/u ortho. 


discussed leaving wound open to drain since at risk for infection and pt wishes 

to proceed with this strategy. 


There is some decreased sensation which I explained to pt and family may be 

nerve injury and this needs f/u with PMD/ortho. 


The risks and benefits or TDAP and x-ray were discussed with pt and family and 

they wish to proceed and these were ordered, x-ray discussed with pt and family 

after results. 


Occurred: just prior to arrival


Method of Injury: incised


Quality: constant


Severity of Pain-Max: moderate


Severity of Pain-Current: moderate


Extremities Pain Location: 2nd finger: left


Modifying Factors: Improves With: movement


Associated Symptoms: none


Allergies/Adverse Reactions: 








No Known Drug Allergies Allergy (Verified 08/06/23 18:33)


   





Hx Tetanus, Diphtheria Vaccination/Date Given: No


Hx Influenza Vaccination/Date Given: No


Hx Pneumococcal Vaccination/Date Given: No


Immunizations Up to Date: Yes





Travel Risk





- International Travel


Have you traveled outside of the country in past 3 weeks: No





- Coronavirus Screening


Are you exhibiting any of the following symptoms?: No


Close contact with a COVID-19 positive Pt in past 14-21 Days: No





- Vaccine Status


Have you recieved a Covid-19 vaccination: No





- Review of Systems


Constitutional: No Fever, No Chills


Eyes: No Symptoms


Ears, Nose, & Throat: No Symptoms


Respiratory: No Cough, No Dyspnea


Cardiac: No Chest Pain, No Edema, No Syncope


Abdominal/Gastrointestinal: No Abdominal Pain, No Nausea, No Vomiting, No 

Diarrhea


Genitourinary Symptoms: No Dysuria


Musculoskeletal: No Back Pain, No Neck Pain


Skin: Other (lac), No Rash


Neurological: No Dizziness, No Focal Weakness, No Sensory Changes


Psychological: No Symptoms


Endocrine: No Symptoms


Hematologic/Lymphatic: No Symptoms


Immunological/Allergic: No Symptoms


All Other Systems: Reviewed and Negative





- Past Medical History


Pertinent Past Medical History: Yes


Neurological History: Other


ENT History: Other


Cardiac History: No Pertinent History


Respiratory History: No Pertinent History


Endocrine Medical History: Hypothyroidism


Musculoskeletal History: No Pertinent History


GI Medical History: Other


 History: No Pertinent History


Psycho-Social History: No Pertinent History


Female Reproductive Disorders: No Pertinent History


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 

like activity whe she has pain. legally blind





- Past Surgical History


Past Surgical History: Yes


Neuro Surgical History: No Pertinent History


Cardiac: No Pertinent History


Respiratory: No Pertinent History


Gastrointestinal: Other


Genitourinary: No Pertinent History


Musculoskeletal: No Pertinent History


Female Surgical History: Tubal Ligation, Other


Other Surgical History: vaginal reconstruction.  tubal.  sphincterotomy





- Social History


Smoking Status: Never smoker


Exposure to second hand smoke: No


Drug Use: none


Patient Lives Alone: No





- Female History


Hx Last Menstrual Period: 3 weks ago


Hx Pregnant Now: No





- Nursing Vital Signs


Nursing Vital Signs: 


                               Initial Vital Signs











Temperature  97.4 F   08/06/23 19:13


 


Pulse Rate  80   08/06/23 19:13


 


Respiratory Rate  18   08/06/23 19:13


 


Blood Pressure  105/66   08/06/23 19:13


 


O2 Sat by Pulse Oximetry  99   08/06/23 19:13








                                   Pain Scale











Pain Intensity                 2

















- Physical Exam


General Appearance: alert


Eyes, Ears, Nose, Throat Exam: moist mucous membranes


Neck Exam: non-tender, supple


Cardiovascular/Respiratory Exam: chest non-tender, normal breath sounds, regular

rate/rhythm, no respiratory distress


Abdominal Exam: non-tender, No guarding


Back Exam: normal inspection, No vertebral tenderness


Shoulder Exam: normal inspection, non-tender, no evidence of injury, normal ROM


Elbow/Forearm Exam: normal inspection, non-tender, no evidence of injury, normal

ROM


Wrist Exam: normal inspection, non-tender, no evidence of injury, normal ROM


Hand Exam: bone tenderness, laceration


DTR - Upper Extremity Exam: bicep (R): 2+, bicep (L): 2+, tricep (R): 2+, tricep

(L): 2+


Neuro/Tendon Exam: normal motor functions, normal tendon functions, sensory 

deficit (tip of left index)


Mental Status Exam: alert, oriented x 3, cooperative


Skin Exam: normal color, warm, dry





- Course


Nursing assessment & vital signs reviewed: Yes





- Radiology Exams


  ** Left Hand


X-ray Interpretation: Reviewed by me, Discussed w/ radiologist, No Fracture, 

Other (excluding penetration by rad overreading and none reported on that 

reading by radiologist)


Ordered Tests: 


                               Active Orders 24 hr











 Category Date Time Status


 


 HAND (MINIMUM 3 VIEWS) Stat Exams  08/06/23 19:11 Completed








Medication Summary














Discontinued Medications














Generic Name Dose Route Start Last Admin





  Trade Name Freq  PRN Reason Stop Dose Admin


 


Diphtheria/Tetanus/Acell Pertussis  0.5 ml  08/06/23 19:10  08/06/23 19:53





  Tdap --Diph,Pertuss(Acell),Tet Vac/Pf 0.5 Ml Vial  IM  08/06/23 19:11  0.5 ml





  .ONCE ONE   Administration


 


Diphtheria/Tetanus/Acell Pertussis  Confirm  08/06/23 19:52 





  Tdap --Diph,Pertuss(Acell),Tet Vac/Pf 0.5 Ml Vial  Administered  08/06/23 

19:53 





  Dose  





  0.5 ml  





  IM  





  .STK-MED ONE  














- Progress


Progress: improved, re-examined


Progress Note: 





08/06/23 21:48


discussed risks/benefits of antibiotics and pt wishes to proceed . 


also discussed risks/benefits of  leaving lac open to promote drainage and she 

prefers this to stitches and has the capacity to make this choice. 


08/06/23 21:50





consulted radiologist for telereading


Counseled pt/family regarding: diagnosis, need for follow-up, rad results





Medical Desision Making





- Independent Historian


Additional History obtained from: Family





- Discussion of managment


Care discussed with:: specialist (radiologist)


Agreed on:: need for follow-up





- Diagnostic Testing


Diagnostic test were ordered, analyzed, and reviewed by me: Yes


Radiological Interpretation: Reviewed by me, Teleradiologist Report





- Risk of complications


The pt has a mod risk of morbidity or mortality based on: Need for prescription 

drug management





- Departure


Departure Disposition: Home


Clinical Impression: 


 impaled left index finger with nerve inj, laceration in area of extensor 

tendon, neuroparaxia or digital nerve laceration





Condition: Good


Critical Care Time: No


Referrals: 


ELIZABETH,MARIS, MD [Primary Care Provider] - Follow up/PCP as directed


Instructions:  Wound Care (DC)


Additional Instructions: 


Soak the finger in epsom salts twice a day and apply the antibiotic ointment 

until healed. follow up with your Dr. and orthopedic as there could be delayed 

complications  including infection and there is likely some injury to both the 

extensor tendon and the digital nerve.  The tendon has retained function and may

heal without intervention but should be follow up. the nerve may also heal and 

take time.  return meantime if redness, drainage, increased pain or other 

concerns. 


Prescriptions: 


Mupirocin*** [Bactroban OINTMENT***] 22 gm TP BID #1 cartridge


Cephalexin Mh 500 mg** [Keflex 500 mg**] 500 mg PO TID 7 Days #30 cap